# Patient Record
Sex: MALE | Race: WHITE | NOT HISPANIC OR LATINO | Employment: FULL TIME | URBAN - METROPOLITAN AREA
[De-identification: names, ages, dates, MRNs, and addresses within clinical notes are randomized per-mention and may not be internally consistent; named-entity substitution may affect disease eponyms.]

---

## 2021-08-04 ENCOUNTER — OFFICE VISIT (OUTPATIENT)
Dept: FAMILY MEDICINE CLINIC | Facility: CLINIC | Age: 26
End: 2021-08-04

## 2021-08-04 VITALS
TEMPERATURE: 97.1 F | OXYGEN SATURATION: 97 % | RESPIRATION RATE: 16 BRPM | DIASTOLIC BLOOD PRESSURE: 84 MMHG | HEIGHT: 72 IN | WEIGHT: 291.3 LBS | BODY MASS INDEX: 39.45 KG/M2 | HEART RATE: 100 BPM | SYSTOLIC BLOOD PRESSURE: 120 MMHG

## 2021-08-04 DIAGNOSIS — R19.7 DIARRHEA, UNSPECIFIED TYPE: ICD-10-CM

## 2021-08-04 DIAGNOSIS — Z00.01 ENCOUNTER FOR HEALTH MAINTENANCE EXAMINATION WITH ABNORMAL FINDINGS: Primary | ICD-10-CM

## 2021-08-04 DIAGNOSIS — Z87.09 HISTORY OF ASTHMA: ICD-10-CM

## 2021-08-04 DIAGNOSIS — Z13.1 SCREENING FOR DIABETES MELLITUS: ICD-10-CM

## 2021-08-04 DIAGNOSIS — S39.012D STRAIN OF LUMBAR REGION, SUBSEQUENT ENCOUNTER: ICD-10-CM

## 2021-08-04 DIAGNOSIS — F41.9 ANXIETY: ICD-10-CM

## 2021-08-04 DIAGNOSIS — Z13.0 SCREENING FOR DEFICIENCY ANEMIA: ICD-10-CM

## 2021-08-04 PROCEDURE — 99214 OFFICE O/P EST MOD 30 MIN: CPT | Performed by: NURSE PRACTITIONER

## 2021-08-04 PROCEDURE — 99385 PREV VISIT NEW AGE 18-39: CPT | Performed by: NURSE PRACTITIONER

## 2021-08-04 RX ORDER — ALBUTEROL SULFATE 90 UG/1
2 AEROSOL, METERED RESPIRATORY (INHALATION) EVERY 4 HOURS PRN
Qty: 6.7 G | Refills: 0 | Status: SHIPPED | OUTPATIENT
Start: 2021-08-04

## 2021-08-04 RX ORDER — HYDROXYZINE HYDROCHLORIDE 25 MG/1
25 TABLET, FILM COATED ORAL 3 TIMES DAILY PRN
Qty: 30 TABLET | Refills: 0 | Status: SHIPPED | OUTPATIENT
Start: 2021-08-04 | End: 2023-04-06
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
## 2021-08-05 LAB
ALBUMIN SERPL-MCNC: 4.6 G/DL (ref 3.5–5.2)
ALBUMIN/GLOB SERPL: 1.8 G/DL
ALP SERPL-CCNC: 81 U/L (ref 39–117)
ALT SERPL-CCNC: 25 U/L (ref 1–41)
AST SERPL-CCNC: 22 U/L (ref 1–40)
BASOPHILS # BLD AUTO: 0.06 10*3/MM3 (ref 0–0.2)
BASOPHILS NFR BLD AUTO: 0.7 % (ref 0–1.5)
BILIRUB SERPL-MCNC: 0.4 MG/DL (ref 0–1.2)
BUN SERPL-MCNC: 11 MG/DL (ref 6–20)
BUN/CREAT SERPL: 14.7 (ref 7–25)
CALCIUM SERPL-MCNC: 9.5 MG/DL (ref 8.6–10.5)
CHLORIDE SERPL-SCNC: 105 MMOL/L (ref 98–107)
CO2 SERPL-SCNC: 23.9 MMOL/L (ref 22–29)
CREAT SERPL-MCNC: 0.75 MG/DL (ref 0.76–1.27)
EOSINOPHIL # BLD AUTO: 0.17 10*3/MM3 (ref 0–0.4)
EOSINOPHIL NFR BLD AUTO: 1.9 % (ref 0.3–6.2)
ERYTHROCYTE [DISTWIDTH] IN BLOOD BY AUTOMATED COUNT: 12.6 % (ref 12.3–15.4)
GLOBULIN SER CALC-MCNC: 2.6 GM/DL
GLUCOSE SERPL-MCNC: 121 MG/DL (ref 65–99)
HBA1C MFR BLD: 5.3 % (ref 4.8–5.6)
HCT VFR BLD AUTO: 45.7 % (ref 37.5–51)
HGB BLD-MCNC: 15.7 G/DL (ref 13–17.7)
IMM GRANULOCYTES # BLD AUTO: 0.04 10*3/MM3 (ref 0–0.05)
IMM GRANULOCYTES NFR BLD AUTO: 0.4 % (ref 0–0.5)
LYMPHOCYTES # BLD AUTO: 1.86 10*3/MM3 (ref 0.7–3.1)
LYMPHOCYTES NFR BLD AUTO: 20.3 % (ref 19.6–45.3)
MCH RBC QN AUTO: 30.7 PG (ref 26.6–33)
MCHC RBC AUTO-ENTMCNC: 34.4 G/DL (ref 31.5–35.7)
MCV RBC AUTO: 89.3 FL (ref 79–97)
MONOCYTES # BLD AUTO: 0.75 10*3/MM3 (ref 0.1–0.9)
MONOCYTES NFR BLD AUTO: 8.2 % (ref 5–12)
NEUTROPHILS # BLD AUTO: 6.29 10*3/MM3 (ref 1.7–7)
NEUTROPHILS NFR BLD AUTO: 68.5 % (ref 42.7–76)
NRBC BLD AUTO-RTO: 0 /100 WBC (ref 0–0.2)
PLATELET # BLD AUTO: 331 10*3/MM3 (ref 140–450)
POTASSIUM SERPL-SCNC: 4 MMOL/L (ref 3.5–5.2)
PROT SERPL-MCNC: 7.2 G/DL (ref 6–8.5)
RBC # BLD AUTO: 5.12 10*6/MM3 (ref 4.14–5.8)
SODIUM SERPL-SCNC: 142 MMOL/L (ref 136–145)
TSH SERPL DL<=0.005 MIU/L-ACNC: 1.1 UIU/ML (ref 0.27–4.2)
WBC # BLD AUTO: 9.17 10*3/MM3 (ref 3.4–10.8)

## 2023-04-05 NOTE — PROGRESS NOTES
Chief Complaint  Annual Exam    Subjective        Brennan Sanchez presents to [facility]  History of Present Illness  Brennan Sanchez is a 27 y.o. male who presents to the clinic today for an annual physical exam.  Patient has a history of anxiety and asthma.  He denies recent asthma flareups or nighttime coughing.  He did have some symptoms in the winter with the weather change, but is doing well today.    Annual Exam:  · Diet: He stopped eating fast food and is eating a more gluten free diet. He is watching portion sizes and eating less.   · Exercise: He is exercising 4 times a week doing [employer] with strength and HIIT workouts. He also likes to walk after dinner.    · Immunizations: He has had two COVID-19 vaccines and one booster. He is due for second COVID-19 booster vaccine.  He is due for Tdap.    · Sleep/mental health: He denies issues with sleep.  He does have anxiety every now and then, but it is not debilitating.  He denies depressive symptoms. PHQ-2 Total Score: 0.   · Sexual health: Currently sexually active with 1 female partner.  Form of protection: Condom.   · Vision/dental: He denies problems with his vision.  He does not wear contacts or glasses.  He is not up-to-date with routine dental exams.  · Social history: Patient drinks alcohol socially.  He denies smoking or drug use.    · Family history: As listed below.     Health Maintenance   Topic Date Due   • TDAP/TD VACCINES (1 - Tdap) Never done   • HEPATITIS C SCREENING  Never done   • COVID-19 Vaccine (2 - Pfizer series) 04/12/2022   • INFLUENZA VACCINE  08/01/2023   • ANNUAL PHYSICAL  04/06/2024   • Pneumococcal Vaccine 0-64  Aged Out     Family History   Problem Relation Age of Onset   • No Known Problems Mother    • Hypertension Father    • Diabetes Father    • Heart disease Maternal Grandmother        Review of Systems   Constitutional: Negative.    HENT: Negative.    Eyes: Negative.    Respiratory: Negative.   "  Cardiovascular: Negative.    Gastrointestinal: Negative.    Endocrine: Negative.    Genitourinary: Negative.    Skin: Negative.    Neurological: Negative.    Psychiatric/Behavioral: Negative.       Objective   Vital Signs:  /78 (BP Location: Right arm, Patient Position: Sitting, Cuff Size: Large Adult)   Pulse 92   Temp 98.4 °F (36.9 °C) (Temporal)   Ht 182.9 cm (72\")   Wt 127 kg (280 lb 9.6 oz)   SpO2 98%   BMI 38.06 kg/m²   Estimated body mass index is 38.06 kg/m² as calculated from the following:    Height as of this encounter: 182.9 cm (72\").    Weight as of this encounter: 127 kg (280 lb 9.6 oz).             Physical Exam  Vitals reviewed.   Constitutional:       General: He is not in acute distress.     Appearance: Normal appearance. He is obese. He is not ill-appearing, toxic-appearing or diaphoretic.   HENT:      Head: Normocephalic and atraumatic.      Right Ear: Tympanic membrane and ear canal normal.      Left Ear: Tympanic membrane and ear canal normal.      Ears:      Comments: Mild erythema to bilateral ear canals  Eyes:      General: No scleral icterus.        Left eye: No discharge.      Extraocular Movements: Extraocular movements intact.   Neck:      Thyroid: No thyroid mass, thyromegaly or thyroid tenderness.   Cardiovascular:      Rate and Rhythm: Normal rate and regular rhythm.      Heart sounds: Normal heart sounds.   Pulmonary:      Effort: Pulmonary effort is normal. No respiratory distress.      Breath sounds: Normal breath sounds. No stridor. No wheezing.   Abdominal:      General: Bowel sounds are normal. There is no distension.      Palpations: Abdomen is soft.      Tenderness: There is no abdominal tenderness.   Neurological:      General: No focal deficit present.      Mental Status: He is alert and oriented to person, place, and time.      Motor: No weakness.      Gait: Gait normal.   Psychiatric:         Mood and Affect: Mood normal.         Behavior: Behavior normal. "        Result Review :                   Assessment and Plan   Diagnoses and all orders for this visit:    1. Encounter for health maintenance examination in adult (Primary)    2. Screening for lipid disorders  -     Lipid Panel    3. Screening for deficiency anemia  -     CBC w AUTO Differential    4. Need for hepatitis C screening test  -     Hepatitis C antibody    5. Screening for diabetes mellitus  -     Comprehensive metabolic panel    6. Elevated glucose  -     Hemoglobin A1c      Preventative counseling: Patient is a pleasant 27-year-old male here today for his annual physical.  Patient's vital signs are within normal limits. Body mass index is 38.06 kg/m².  Patient has made diet and exercise modifications.  We discussed limiting saturated fats and at least 150 minutes of moderate intensity physical activity per week.  Patient is due for Tdap and is agreeable to this today.  He can receive COVID-19 booster at his local pharmacy.  Patient reports improvement of bowel habits with gluten-free diet.  He is fasting, so routine blood work will be performed today. Patient declines STI screening.         Follow Up   Return in about 1 year (around 4/6/2024), or if symptoms worsen or fail to improve, for Annual physical.  Patient was given instructions and counseling regarding his condition or for health maintenance advice. Please see specific information pulled into the AVS if appropriate.     Electronically signed by MONA Hernandez, 04/06/23, 3:38 PM EDT.

## 2023-04-06 ENCOUNTER — OFFICE VISIT (OUTPATIENT)
Dept: FAMILY MEDICINE CLINIC | Facility: CLINIC | Age: 28
End: 2023-04-06
Payer: COMMERCIAL

## 2023-04-06 VITALS
HEART RATE: 92 BPM | OXYGEN SATURATION: 98 % | TEMPERATURE: 98.4 F | DIASTOLIC BLOOD PRESSURE: 78 MMHG | WEIGHT: 280.6 LBS | BODY MASS INDEX: 38.01 KG/M2 | SYSTOLIC BLOOD PRESSURE: 124 MMHG | HEIGHT: 72 IN

## 2023-04-06 DIAGNOSIS — Z13.220 SCREENING FOR LIPID DISORDERS: ICD-10-CM

## 2023-04-06 DIAGNOSIS — Z00.00 ENCOUNTER FOR HEALTH MAINTENANCE EXAMINATION IN ADULT: Primary | ICD-10-CM

## 2023-04-06 DIAGNOSIS — Z13.0 SCREENING FOR DEFICIENCY ANEMIA: ICD-10-CM

## 2023-04-06 DIAGNOSIS — R73.09 ELEVATED GLUCOSE: ICD-10-CM

## 2023-04-06 DIAGNOSIS — Z23 NEED FOR TDAP VACCINATION: ICD-10-CM

## 2023-04-06 DIAGNOSIS — Z13.1 SCREENING FOR DIABETES MELLITUS: ICD-10-CM

## 2023-04-06 DIAGNOSIS — Z11.59 NEED FOR HEPATITIS C SCREENING TEST: ICD-10-CM

## 2023-04-07 LAB
ALBUMIN SERPL-MCNC: 4.8 G/DL (ref 3.5–5.2)
ALBUMIN/GLOB SERPL: 1.9 G/DL
ALP SERPL-CCNC: 71 U/L (ref 39–117)
ALT SERPL-CCNC: 27 U/L (ref 1–41)
AST SERPL-CCNC: 27 U/L (ref 1–40)
BASOPHILS # BLD AUTO: 0.05 10*3/MM3 (ref 0–0.2)
BASOPHILS NFR BLD AUTO: 0.5 % (ref 0–1.5)
BILIRUB SERPL-MCNC: 0.6 MG/DL (ref 0–1.2)
BUN SERPL-MCNC: 12 MG/DL (ref 6–20)
BUN/CREAT SERPL: 15.4 (ref 7–25)
CALCIUM SERPL-MCNC: 10 MG/DL (ref 8.6–10.5)
CHLORIDE SERPL-SCNC: 106 MMOL/L (ref 98–107)
CHOLEST SERPL-MCNC: 188 MG/DL (ref 0–200)
CO2 SERPL-SCNC: 23 MMOL/L (ref 22–29)
CREAT SERPL-MCNC: 0.78 MG/DL (ref 0.76–1.27)
EGFRCR SERPLBLD CKD-EPI 2021: 124.6 ML/MIN/1.73
EOSINOPHIL # BLD AUTO: 0.11 10*3/MM3 (ref 0–0.4)
EOSINOPHIL NFR BLD AUTO: 1.1 % (ref 0.3–6.2)
ERYTHROCYTE [DISTWIDTH] IN BLOOD BY AUTOMATED COUNT: 12.9 % (ref 12.3–15.4)
GLOBULIN SER CALC-MCNC: 2.5 GM/DL
GLUCOSE SERPL-MCNC: 88 MG/DL (ref 65–99)
HBA1C MFR BLD: 5.3 % (ref 4.8–5.6)
HCT VFR BLD AUTO: 45 % (ref 37.5–51)
HCV IGG SERPL QL IA: NON REACTIVE
HDLC SERPL-MCNC: 39 MG/DL (ref 40–60)
HGB BLD-MCNC: 15.4 G/DL (ref 13–17.7)
IMM GRANULOCYTES # BLD AUTO: 0.04 10*3/MM3 (ref 0–0.05)
IMM GRANULOCYTES NFR BLD AUTO: 0.4 % (ref 0–0.5)
LDLC SERPL CALC-MCNC: 117 MG/DL (ref 0–100)
LYMPHOCYTES # BLD AUTO: 2.44 10*3/MM3 (ref 0.7–3.1)
LYMPHOCYTES NFR BLD AUTO: 24.1 % (ref 19.6–45.3)
MCH RBC QN AUTO: 30.3 PG (ref 26.6–33)
MCHC RBC AUTO-ENTMCNC: 34.2 G/DL (ref 31.5–35.7)
MCV RBC AUTO: 88.6 FL (ref 79–97)
MONOCYTES # BLD AUTO: 0.71 10*3/MM3 (ref 0.1–0.9)
MONOCYTES NFR BLD AUTO: 7 % (ref 5–12)
NEUTROPHILS # BLD AUTO: 6.77 10*3/MM3 (ref 1.7–7)
NEUTROPHILS NFR BLD AUTO: 66.9 % (ref 42.7–76)
NRBC BLD AUTO-RTO: 0 /100 WBC (ref 0–0.2)
PLATELET # BLD AUTO: 322 10*3/MM3 (ref 140–450)
POTASSIUM SERPL-SCNC: 3.9 MMOL/L (ref 3.5–5.2)
PROT SERPL-MCNC: 7.3 G/DL (ref 6–8.5)
RBC # BLD AUTO: 5.08 10*6/MM3 (ref 4.14–5.8)
SODIUM SERPL-SCNC: 140 MMOL/L (ref 136–145)
TRIGL SERPL-MCNC: 183 MG/DL (ref 0–150)
VLDLC SERPL CALC-MCNC: 32 MG/DL (ref 5–40)
WBC # BLD AUTO: 10.12 10*3/MM3 (ref 3.4–10.8)

## 2023-05-23 NOTE — PROGRESS NOTES
"Chief Complaint  Foot Pain (Pt reports extreme pain on inside of R heel x2m. NKI. Pt reports pain averages 6-7 but can jump to 9/10. No tenderness, but painful with pressure)    Subjective        Brennan Sanchez presents to [facility]  History of Present Illness  Brennan Sanchez is a 28 y.o. male who presents to clinic today with complaints of right foot pain.  Patient symptoms started about 2 months ago.  Patient denies any known trauma.  He has not started working out often at that point.  Patient's pain is worsened with standing.  Nothing helps the pain.  He has tried massaging and has also tried using a rollerball to his foot.  He also states the pain seems to be worse when waking up in the morning.  He denies swelling or bruising to his foot.      Review of Systems   Musculoskeletal: Positive for arthralgias.     Objective   Vital Signs:  /78   Pulse 78   Temp 97.7 °F (36.5 °C)   Ht 182.9 cm (72\")   Wt 133 kg (293 lb)   SpO2 99%   BMI 39.74 kg/m²   Estimated body mass index is 39.74 kg/m² as calculated from the following:    Height as of this encounter: 182.9 cm (72\").    Weight as of this encounter: 133 kg (293 lb).               Physical Exam  Vitals reviewed.   Constitutional:       General: He is not in acute distress.     Appearance: Normal appearance. He is obese. He is not ill-appearing, toxic-appearing or diaphoretic.   HENT:      Head: Normocephalic and atraumatic.   Eyes:      General: No scleral icterus.        Right eye: No discharge.         Left eye: No discharge.      Extraocular Movements: Extraocular movements intact.   Pulmonary:      Effort: Pulmonary effort is normal. No respiratory distress.   Musculoskeletal:      Right foot: Tenderness and bony tenderness (Heel) present. No swelling, deformity or laceration.   Feet:      Right foot:      Skin integrity: Skin integrity normal.   Neurological:      General: No focal deficit present.      Mental Status: " He is alert and oriented to person, place, and time.      Motor: No weakness.      Gait: Gait normal.   Psychiatric:         Mood and Affect: Mood normal.         Behavior: Behavior normal.        Result Review :                   Assessment and Plan   Diagnoses and all orders for this visit:    1. Pain of right heel (Primary)  -     XR Foot 3+ View Right (In Office)  -     meloxicam (MOBIC) 15 MG tablet; Take 1 tablet by mouth Daily As Needed for Mild Pain or Moderate Pain.  Dispense: 30 tablet; Refill: 0  -     Ambulatory Referral to Physical Therapy Evaluate and treat      Per xray, patient does have calcaneal heel spur, which could be contributing to his symptoms.  Recommended anti-inflammatory medications to be taken with food, call for any adverse side effects. He was referred to physical therapy.  Patient will call if symptoms do not improve.         Follow Up   Return if symptoms worsen or fail to improve.  Patient was given instructions and counseling regarding his condition or for health maintenance advice. Please see specific information pulled into the AVS if appropriate.       Answers for HPI/ROS submitted by the patient on 5/24/2023  What is the primary reason for your visit?: Lower Extremity Injury  Incident occurred: more than 1 week ago  Incident location: other  Injury mechanism: unknown  Pain location: right heel  Pain quality: shooting  Pain - numeric: 7/10  Pain course: fluctuating  tingling: No  inability to bear weight: No  loss of motion: Yes  loss of sensation: No  muscle weakness: No  Foreign body present: no foreign bodies    Electronically signed by MONA Hernandez, 05/26/23, 10:15 AM EDT.

## 2023-05-24 ENCOUNTER — OFFICE VISIT (OUTPATIENT)
Dept: FAMILY MEDICINE CLINIC | Facility: CLINIC | Age: 28
End: 2023-05-24
Payer: COMMERCIAL

## 2023-05-24 VITALS
WEIGHT: 293 LBS | OXYGEN SATURATION: 99 % | HEART RATE: 78 BPM | TEMPERATURE: 97.7 F | BODY MASS INDEX: 39.68 KG/M2 | DIASTOLIC BLOOD PRESSURE: 78 MMHG | SYSTOLIC BLOOD PRESSURE: 114 MMHG | HEIGHT: 72 IN

## 2023-05-24 DIAGNOSIS — M79.671 PAIN OF RIGHT HEEL: Primary | ICD-10-CM

## 2023-05-24 RX ORDER — MELOXICAM 15 MG/1
15 TABLET ORAL DAILY PRN
Qty: 30 TABLET | Refills: 0 | Status: SHIPPED | OUTPATIENT
Start: 2023-05-24

## 2023-07-26 ENCOUNTER — TREATMENT (OUTPATIENT)
Dept: PHYSICAL THERAPY | Facility: CLINIC | Age: 28
End: 2023-07-26
Payer: COMMERCIAL

## 2023-07-26 DIAGNOSIS — M79.671 PAIN OF RIGHT HEEL: Primary | ICD-10-CM

## 2023-07-26 NOTE — PROGRESS NOTES
Physical Therapy Daily Note    Patient Name: Brennan Sanchez         :  1995  Referring Physician: Tiana Abdul APRN  Treatment Date: 2023  Date of Initial Visit: No linked episodes    Visit Diagnoses:    ICD-10-CM ICD-9-CM   1. Pain of right heel  M79.671 729.5       _____________________________________________________    Subjective   Brennan Sanchez reports: minimal pain over weekend at [employer] despite being on feet all weekend - tape very helpful - Lasted until late Saturday -  no tape, but did well - no pain and minimal / no pain this week, but not working so not on his feet a lot -     Objective   Pt ambulating w/ normal gait   Minimal tenderness -     US: 1.0 MHz; 2.0 w/cm2 x 6 min to volar and medial calcaneus (R)      MANUAL THERAPY:   DTM to PF, Volar, Volar Med calcaneus (R)  Jt mobs to address Flexed 1st ray and Forefoot varus ; Gd lll w/ end-range oscillations -   Manual Stretching Gastrocs / Soleus / Plantar Facia  Forefoot mobs to address forefoot varus and flexed first ray - Grade lll     EXERCISE:   STANDING GASTROC STRETCHING 2x60 sec  STANDING SOLEUS STRETCHING 2x60 sec  STANDING STEP STRETCH 2x60 sec  SLS (R) LE x 2 min  WOBBLE BOARD - Front / Back; Side/Side; Angled x 2 2 min ea  HEEL RAISES off a STEP x 15       Functional / Therapeutic Activities:    TAPING / BRACING: Extensive Taping including 1in athletic tape x 4 strips from 1st MPJ around heel to 5, 4, 3, 2 MPJ;  2in athletic tape Lat foot up medial arch to unload w/ Leuko tape over x 2 layers:  To unload Plantar fascia and support medial arch (R)    Lap on track to assess pain / gait pattern w/ tape and after exercise     Assessment/Plan  27 yo male: (R) heel pain / Plantar fasciits -   Newer taping technique very helpful - pain significantly reduced even w/o tape and non-existent w/ tape in place. Improved functional ability -     Progress strengthening /stabilization /functional activity        _________________________________________________    Manual Therapy:            08     mins  84302;  Therapeutic Exercise:    08    mins  35253;     Neuromuscular Alondra:   08     mins  95531;    Therapeutic Activity:     20      mins  51341;     Ultrasound:                    06      mins  55076;  Iontophoresis:                     mins  93896;    Electrical Stimulation:         mins  72851 ( );  Mechanical Traction:          mins  53841  Dry Needling                       mins self-pay     Timed Treatment:   50    mins                  Total Treatment:     50    mins        Cristian Lange PT  Physical Therapist  Lic # [id number]

## 2023-07-31 ENCOUNTER — TREATMENT (OUTPATIENT)
Dept: PHYSICAL THERAPY | Facility: CLINIC | Age: 28
End: 2023-07-31
Payer: COMMERCIAL

## 2023-07-31 DIAGNOSIS — M79.671 PAIN OF RIGHT HEEL: Primary | ICD-10-CM

## 2023-07-31 PROCEDURE — 97035 APP MDLTY 1+ULTRASOUND EA 15: CPT | Performed by: PHYSICAL THERAPIST

## 2023-07-31 PROCEDURE — 97530 THERAPEUTIC ACTIVITIES: CPT | Performed by: PHYSICAL THERAPIST

## 2023-07-31 PROCEDURE — 97140 MANUAL THERAPY 1/> REGIONS: CPT | Performed by: PHYSICAL THERAPIST

## 2023-08-03 ENCOUNTER — TREATMENT (OUTPATIENT)
Dept: PHYSICAL THERAPY | Facility: CLINIC | Age: 28
End: 2023-08-03
Payer: COMMERCIAL

## 2023-08-03 DIAGNOSIS — M79.671 PAIN OF RIGHT HEEL: Primary | ICD-10-CM

## 2023-08-07 ENCOUNTER — TREATMENT (OUTPATIENT)
Dept: PHYSICAL THERAPY | Facility: CLINIC | Age: 28
End: 2023-08-07
Payer: COMMERCIAL

## 2023-08-07 DIAGNOSIS — M79.671 PAIN OF RIGHT HEEL: Primary | ICD-10-CM

## 2023-08-07 PROCEDURE — 97035 APP MDLTY 1+ULTRASOUND EA 15: CPT | Performed by: PHYSICAL THERAPIST

## 2023-08-07 PROCEDURE — 97530 THERAPEUTIC ACTIVITIES: CPT | Performed by: PHYSICAL THERAPIST

## 2023-08-07 PROCEDURE — 97140 MANUAL THERAPY 1/> REGIONS: CPT | Performed by: PHYSICAL THERAPIST

## 2023-08-07 NOTE — PROGRESS NOTES
Physical Therapy Daily Note    Patient Name: Brennan Sanchez         :  1995  Referring Physician: Tiana Abdul APRN  Treatment Date: 2023  Date of Initial Visit: Type: THERAPY  Noted: 2023    Visit Diagnoses:    ICD-10-CM ICD-9-CM   1. Pain of right heel  M79.671 729.5       _____________________________________________________    Subjective   Brennan Sanchez reports: tape very helpful - w/o tape - notes some soreness medial / volar heel -     Objective   Tender volar/medial calcaneus, but more localized and less severe -     US: 100%, 1.0 MHz, 2.0 W/CM2 x 8 min medial/volar calcaneus (R)    MANUAL THERAPY:   DTM to volar, Medial/Volar calcaneus and plantar fascia  Manual stretching gastroc / PF (R)   REARFOOT MOBS to Facilitate Calc Varus      Functional / Therapeutic Activities:    TAPING / BRACING: Extensive Taping including 1in athletic tape x 4 strips from 1st MPJ around heel to 5, 4, 3, 2 MPJ;  2in athletic tape Lat foot up medial arch to unload w/ Leuko tape over x 2 layers:  To unload Plantar fascia and support medial arch (R)    Leukotape to facilitate calcaneal varus (R)  Jt protection, ADL modification; Posture and       Assessment/Plan  27 yo male: (R) heel pain / Plantar fasciits -   Newer taping technique very helpful - pain significantly reduced even w/o tape and non-existent w/ tape in place. Improved functional ability -    Increased pain w/ work w/o tape, but alleviated w/ taping especially w/ taping to facilitate calcaneal varus -   Would benefit from more appropriate shoes and custom orthotics -      Progress strengthening /stabilization /functional activity     _________________________________________________     Manual Therapy:            15     mins  19975;  Therapeutic Exercise:        mins  71522;     Neuromuscular Alondra:        mins  63821;    Therapeutic Activity:     23      mins  78496;     Ultrasound:                    08      mins  17264;  Iontophoresis:                      mins  44372;    Electrical Stimulation:         mins  21468 ( );  Mechanical Traction:          mins  56901  Dry Needling                       mins self-pay     Timed Treatment:   46    mins                  Total Treatment:     46    mins      Cristian Lange PT  Physical Therapist  Lic # [id number]

## 2023-08-10 ENCOUNTER — TREATMENT (OUTPATIENT)
Dept: PHYSICAL THERAPY | Facility: CLINIC | Age: 28
End: 2023-08-10
Payer: COMMERCIAL

## 2023-08-10 DIAGNOSIS — M79.671 PAIN OF RIGHT HEEL: Primary | ICD-10-CM

## 2023-08-10 NOTE — PROGRESS NOTES
Physical Therapy Daily Note    Patient Name: Brennan Sanchez         :  1995  Referring Physician: Tiana Abdul APRN  Treatment Date: 8/10/2023  Date of Initial Visit: No linked episodes    Visit Diagnoses:    ICD-10-CM ICD-9-CM   1. Pain of right heel  M79.671 729.5       _____________________________________________________    Subjective   Brennan Sanchez reports: no pain w/ tape in place - tape came off yesterday and he did well w/o it at work today - Bought some OTC inserts     Objective   Much less tender volar/medial calcaneus (R) - much more localized and less severe -   Assessed inserts - added medial post to calcaneus (R) insert to prevent excessive valgus compensation        MANUAL THERAPY:   DTM to volar, Medial/Volar calcaneus and plantar fascia  Manual stretching gastroc / PF (R)   REARFOOT MOBS to Facilitate Calc Varus      Functional / Therapeutic Activities:    TAPING / BRACING: K-Tape to unload PF, facilitate transverse arch with leukotape to unload Plantar fascia and support medial arch (R)    Leukotape to facilitate calcaneal varus (R)  Modifed orthotic w/ medial rearfoot posting to prevent calcaneal valgus compensation     Assessment/Plan  29 yo male: (R) heel pain / Plantar fasciits -   Taping technique very helpful - pain significantly reduced even w/o tape and non-existent w/ tape in place. Improved functional ability -    Increased pain w/ work w/o tape, but alleviated w/ taping especially w/ taping to facilitate calcaneal varus -   Would benefit from more appropriate shoes and custom orthotics -      Progress strengthening /stabilization /functional activity     _________________________________________________     Manual Therapy:            15     mins  62943;  Therapeutic Exercise:        mins  26538;     Neuromuscular Alondra:        mins  10461;    Therapeutic Activity:     25      mins  64308;     Ultrasound:                          mins  48272;  Iontophoresis:                      mins  67080;    Electrical Stimulation:         mins  94946 ( );  Mechanical Traction:          mins  13424  Dry Needling                       mins self-pay     Timed Treatment:   40  mins                  Total Treatment:     40    mins        Cristian Lange PT  Physical Therapist  Lic # [id number]

## 2023-08-14 ENCOUNTER — TREATMENT (OUTPATIENT)
Dept: PHYSICAL THERAPY | Facility: CLINIC | Age: 28
End: 2023-08-14
Payer: COMMERCIAL

## 2023-08-14 DIAGNOSIS — M79.671 PAIN OF RIGHT HEEL: Primary | ICD-10-CM

## 2023-08-14 NOTE — PROGRESS NOTES
Physical Therapy Daily Note    Patient Name: Brennan Sanchez         :  1995  Referring Physician: Tiana Abdul APRN  Treatment Date: 2023  Date of Initial Visit: Type: THERAPY  Noted: 2023    Visit Diagnoses:    ICD-10-CM ICD-9-CM   1. Pain of right heel  M79.671 729.5       _____________________________________________________    Subjective   Brennan Sanchez reports: continued improvement - Heel posting very helpful - worked today w/o tape and noted no heel pain -   Wants wife to learn how to tape for future use if needed -     Objective   Much less tender medial / volar calcaneus and much more isolated -       MANUAL THERAPY:   DTM to volar, Medial/Volar calcaneus and plantar fascia  Manual stretching gastroc / PF (R)   REARFOOT MOBS to Facilitate Calc Varus      Functional / Therapeutic Activities:    TAPING / BRACING: K-Tape to unload PF, facilitate transverse arch with leukotape to unload Plantar fascia and support medial arch (R)    Leukotape to facilitate calcaneal varus (R)  Instructed wife in use of tape and application as noted above     Assessment/Plan  27 yo male: (R) heel pain / Plantar fasciits -   Taping technique very helpful - pain significantly reduced even w/o tape and non-existent w/ tape in place. Improved functional ability -    May benefit from more appropriate shoes and custom orthotics -      Progress strengthening /stabilization /functional activity     _________________________________________________    Manual Therapy:            08     mins  84007;  Therapeutic Exercise:        mins  12385;     Neuromuscular Alondra:        mins  14028;    Therapeutic Activity:      23     mins  61728;     Ultrasound:                          mins  45932;  Iontophoresis:                     mins  19008;    Electrical Stimulation:         mins  38987 ( );  Mechanical Traction:          mins  78861  Dry Needling                       mins self-pay     Timed Treatment:   31    mins                   Total Treatment:     31    mins        Cristian Lange, PT  Physical Therapist  Lic # [id number]

## 2023-08-21 ENCOUNTER — TREATMENT (OUTPATIENT)
Dept: PHYSICAL THERAPY | Facility: CLINIC | Age: 28
End: 2023-08-21
Payer: COMMERCIAL

## 2023-08-21 DIAGNOSIS — M79.671 PAIN OF RIGHT HEEL: Primary | ICD-10-CM

## 2023-08-21 NOTE — PROGRESS NOTES
Physical Therapy Daily Note    Patient Name: Brennan Sanchez         :  1995  Referring Physician: Tiana Abdul APRN  Treatment Date: 2023  Date of Initial Visit: No linked episodes    Visit Diagnoses:    ICD-10-CM ICD-9-CM   1. Pain of right heel  M79.671 729.5       _____________________________________________________    Subjective   Brennan Sanchez reports: tape came off last Wednesday - Decreased volar/medial heel pain even w/o tape even working - Still notes some pain 3/10 at worst - best with taping -     Objective   Tenderness much more isolated volar/med calcaneus - (R)   AROM WNL   Improved flexibility grossly -     TREATMENT:   EXERCISES:   GASTROC and SOLEUS STRETCHING; 2x 60 sec ea (R)  STEP GASTROC / PF STRETCH 2x 60 sec (R)  HEEL RAISES OFF STEP x 20  SLS on /2 dome ball; x 2 min (R)  BALANCE BALL: Frnt/Bk; Side/Side; Angled x 2   x 2 min each  RUNNER STEP UP (hole 4 from bottom) x 20 R/L  LATERAL STEP UP w/ ABDuction (hole 4 from bottom) x 20 R/L  TREADMILL: Fwd; 2.5 mph  2 min 0-deg; 1 min 5-deg;  1 min 10-deg; 1 min 15 deg incline                        Retro;  1.0 mph;  2 min 0-deg; 1 min 5-deg;  1 min 10-deg; 1 min 15 deg incline    NMR:   Verbal and tactile cues to facilitate / inhibit firing of glute medius / LE musculature statically and dynamically.; Balance / Proprioception Activities - SEE EXERCISEs -     Functional / Therapeutic Activities:    TAPING / BRACING: K-Tape to unload PF, facilitate transverse arch with leukotape to unload Plantar fascia and support medial arch (R)  -HELD  Leukotape to facilitate calcaneal varus (R) --HELD  FUNCTIONAL ASSESSMENT -  SEE EXERCISE FLOW SHEET -        FAAM 71/84 = CI:  At least 1%, but < 20% impaired    Assessment/Plan  27 yo male: (R) heel pain / Plantar fasciits -   Taping technique very helpful - pain significantly reduced even w/o tape and non-existent w/ tape in place. Improved functional ability -    May benefit from more  appropriate shoes and custom orthotics -    GOAL STATUS:   SHORT TERM GOALS:    1) HEP Initiated; -MET  2) Pain decreased 50%: -MET  3) Improved Gait / functional ability with foot taping - MET    LONG TERM GOALS::   1) (I) HEP; - PROGRESSING TOWARDS  2) Strength to be able to perform all ADL's and job-related activities w/o pain including climbing in/out of truck, ramps w/ a load;   3) Pt able to ambulate functional distances for ADL, Job, Exercising w/o pain including first thing in AM and after sitting - w/o taping - PROGRESSING TOWARDS  4) FAAM Score increased > 80      Brennan would benefit from continued Physical Therapy to allow full and safe return to ADLs and his normal job duties.     Recommend continued Physical Therapy to allow a full and safe return to ADL's and normal job duties. 2x/wk x 4 weeks -   Progress strengthening /stabilization /functional activity       _________________________________________________    Manual Therapy:                 mins  02662;  Therapeutic Exercise:    10    mins  33127;     Neuromuscular Alondra:   10     mins  45641;    Therapeutic Activity:     25      mins  84654;     Ultrasound:                          mins  63909;  Iontophoresis:                     mins  69728;    Electrical Stimulation:         mins  81136 ( );  Mechanical Traction:          mins  79103  Dry Needling                       mins self-pay     Timed Treatment:   45    mins                  Total Treatment:     45    mins        Cristian Lange PT  Physical Therapist  Lic # [id number]

## 2023-08-24 ENCOUNTER — TREATMENT (OUTPATIENT)
Dept: PHYSICAL THERAPY | Facility: CLINIC | Age: 28
End: 2023-08-24
Payer: COMMERCIAL

## 2023-08-24 DIAGNOSIS — M79.671 PAIN OF RIGHT HEEL: Primary | ICD-10-CM

## 2023-08-28 NOTE — PROGRESS NOTES
Physical Therapy Daily Note    Patient Name: Brennan Sanchez         :  1995  Referring Physician: Tiana Abdul APRN  Treatment Date: 2023  Date of Initial Visit: Type: THERAPY  Noted: 2023    Visit Diagnoses:    ICD-10-CM ICD-9-CM   1. Pain of right heel  M79.671 729.5       _____________________________________________________    Subjective   Brennan Sanchez reports: purchasing new shoes and with his modified inserts, he has had no pain even with working multiple days - Notes no functional limitations - 100% improved -    Objective   Pt presents w/ HOKA shoes - Normal gait - Full ROM - Minimal / no tenderness medial/volar calcaneus (R) -     TREATMENT:     EXERCISES:  Reviewed HEP      Functional / Therapeutic Activities:    TAPING / BRACING: NA  Assessed shoes and appropriateness -   FUNATIONAL ASSESSMENT _  Reviewed Jt protection, importance to maintain stretching / HEP to prevent future issues -     Assessment/Plan  27 yo male: (R) heel pain / Plantar fasciits -   Taping techniques were very helpful in alleviating pain and allowing improved gait / function - Now even painfree w/o tape, but w/ customized orthotic and new shoes -    GOALS MET -   Brennan would benefit from continuing with his HEP and discontinuing formal Physical Therapy at this time -     DC PT to HEP  -        _________________________________________________    Manual Therapy:                 mins  22411;  Therapeutic Exercise:    08    mins  55305;     Neuromuscular Alondra:        mins  85957;    Therapeutic Activity:     23      mins  86741;     Ultrasound:                          mins  01507;  Iontophoresis:                     mins  18674;    Electrical Stimulation:         mins  53519 ( );  Mechanical Traction:          mins  53311  Dry Needling                       mins self-pay     Timed Treatment:   31    mins                  Total Treatment:     31    mins        Cristian Lange, PT  Physical Therapist  Lic #  [id number]    Access Code: [email or web address]  URL: [email or web address]  Date: 08/27/2023  Prepared by: Eleazar Lange    Exercises  - Towel Scrunches  - 2-3 x daily - 7 x weekly - 1 sets - 20 reps - 3s hold  - Gastroc Stretch on Wall  - 2-3 x daily - 7 x weekly - 1 sets - 2 reps - 60s hold  - Soleus Stretch on Wall  - 2-3 x daily - 7 x weekly - 1 sets - 2 reps - 60s hold  - Single Leg Stance  - 1-2 x daily - 7 x weekly - 1 sets - 1-2 min hold  - Standing Bilateral Heel Raise on Step  - 1 x daily - 7 x weekly - 2-3 sets - 10-15 reps - 3-5 hold  - Single Leg Stance on Foam Pad  - 1 x daily - 7 x weekly - 1 sets - 2-3 minutes hold

## 2023-11-29 DIAGNOSIS — Z87.09 HISTORY OF ASTHMA: ICD-10-CM

## 2023-11-29 RX ORDER — ALBUTEROL SULFATE 90 UG/1
2 AEROSOL, METERED RESPIRATORY (INHALATION) EVERY 4 HOURS PRN
Qty: 6.7 G | Refills: 0 | Status: SHIPPED | OUTPATIENT
Start: 2023-11-29

## 2024-05-08 ENCOUNTER — OFFICE VISIT (OUTPATIENT)
Dept: FAMILY MEDICINE CLINIC | Facility: CLINIC | Age: 29
End: 2024-05-08
Payer: COMMERCIAL

## 2024-05-08 VITALS
HEIGHT: 72 IN | DIASTOLIC BLOOD PRESSURE: 82 MMHG | WEIGHT: 296 LBS | SYSTOLIC BLOOD PRESSURE: 122 MMHG | HEART RATE: 77 BPM | OXYGEN SATURATION: 98 % | TEMPERATURE: 98 F | BODY MASS INDEX: 40.09 KG/M2

## 2024-05-08 DIAGNOSIS — Z13.1 SCREENING FOR DIABETES MELLITUS: ICD-10-CM

## 2024-05-08 DIAGNOSIS — E66.01 CLASS 3 SEVERE OBESITY WITHOUT SERIOUS COMORBIDITY WITH BODY MASS INDEX (BMI) OF 40.0 TO 44.9 IN ADULT, UNSPECIFIED OBESITY TYPE: ICD-10-CM

## 2024-05-08 DIAGNOSIS — Z13.220 SCREENING FOR LIPID DISORDERS: ICD-10-CM

## 2024-05-08 DIAGNOSIS — Z13.29 SCREENING FOR THYROID DISORDER: ICD-10-CM

## 2024-05-08 DIAGNOSIS — Z00.00 ENCOUNTER FOR HEALTH MAINTENANCE EXAMINATION IN ADULT: Primary | ICD-10-CM

## 2024-05-08 DIAGNOSIS — Z13.0 SCREENING FOR DEFICIENCY ANEMIA: ICD-10-CM

## 2024-05-08 PROCEDURE — 99395 PREV VISIT EST AGE 18-39: CPT | Performed by: NURSE PRACTITIONER

## 2024-05-14 LAB
ALBUMIN SERPL-MCNC: 4.9 G/DL (ref 3.5–5.2)
ALBUMIN/GLOB SERPL: 1.8 G/DL
ALP SERPL-CCNC: 71 U/L (ref 39–117)
ALT SERPL-CCNC: 28 U/L (ref 1–41)
AST SERPL-CCNC: 27 U/L (ref 1–40)
BASOPHILS # BLD AUTO: 0.05 10*3/MM3 (ref 0–0.2)
BASOPHILS NFR BLD AUTO: 0.5 % (ref 0–1.5)
BILIRUB SERPL-MCNC: 0.5 MG/DL (ref 0–1.2)
BUN SERPL-MCNC: 13 MG/DL (ref 6–20)
BUN/CREAT SERPL: 16.5 (ref 7–25)
CALCIUM SERPL-MCNC: 9.7 MG/DL (ref 8.6–10.5)
CHLORIDE SERPL-SCNC: 102 MMOL/L (ref 98–107)
CHOLEST SERPL-MCNC: 192 MG/DL (ref 0–200)
CO2 SERPL-SCNC: 23.4 MMOL/L (ref 22–29)
CREAT SERPL-MCNC: 0.79 MG/DL (ref 0.76–1.27)
EGFRCR SERPLBLD CKD-EPI 2021: 123.3 ML/MIN/1.73
EOSINOPHIL # BLD AUTO: 0.18 10*3/MM3 (ref 0–0.4)
EOSINOPHIL NFR BLD AUTO: 1.8 % (ref 0.3–6.2)
ERYTHROCYTE [DISTWIDTH] IN BLOOD BY AUTOMATED COUNT: 12.8 % (ref 12.3–15.4)
GLOBULIN SER CALC-MCNC: 2.7 GM/DL
GLUCOSE SERPL-MCNC: 80 MG/DL (ref 65–99)
HBA1C MFR BLD: 5.6 % (ref 4.8–5.6)
HCT VFR BLD AUTO: 46.3 % (ref 37.5–51)
HDLC SERPL-MCNC: 44 MG/DL (ref 40–60)
HGB BLD-MCNC: 15.8 G/DL (ref 13–17.7)
IMM GRANULOCYTES # BLD AUTO: 0.05 10*3/MM3 (ref 0–0.05)
IMM GRANULOCYTES NFR BLD AUTO: 0.5 % (ref 0–0.5)
LDLC SERPL CALC-MCNC: 119 MG/DL (ref 0–100)
LYMPHOCYTES # BLD AUTO: 2.61 10*3/MM3 (ref 0.7–3.1)
LYMPHOCYTES NFR BLD AUTO: 26.2 % (ref 19.6–45.3)
MCH RBC QN AUTO: 30.1 PG (ref 26.6–33)
MCHC RBC AUTO-ENTMCNC: 34.1 G/DL (ref 31.5–35.7)
MCV RBC AUTO: 88.2 FL (ref 79–97)
MONOCYTES # BLD AUTO: 0.89 10*3/MM3 (ref 0.1–0.9)
MONOCYTES NFR BLD AUTO: 8.9 % (ref 5–12)
NEUTROPHILS # BLD AUTO: 6.19 10*3/MM3 (ref 1.7–7)
NEUTROPHILS NFR BLD AUTO: 62.1 % (ref 42.7–76)
NRBC BLD AUTO-RTO: 0 /100 WBC (ref 0–0.2)
PLATELET # BLD AUTO: 339 10*3/MM3 (ref 140–450)
POTASSIUM SERPL-SCNC: 4.1 MMOL/L (ref 3.5–5.2)
PROT SERPL-MCNC: 7.6 G/DL (ref 6–8.5)
RBC # BLD AUTO: 5.25 10*6/MM3 (ref 4.14–5.8)
SODIUM SERPL-SCNC: 140 MMOL/L (ref 136–145)
TESTOST FREE SERPL-MCNC: 6.8 PG/ML (ref 9.3–26.5)
TESTOST SERPL-MCNC: 288 NG/DL (ref 264–916)
TRIGL SERPL-MCNC: 166 MG/DL (ref 0–150)
TSH SERPL DL<=0.005 MIU/L-ACNC: 1.26 UIU/ML (ref 0.27–4.2)
VLDLC SERPL CALC-MCNC: 29 MG/DL (ref 5–40)
WBC # BLD AUTO: 9.97 10*3/MM3 (ref 3.4–10.8)

## 2024-05-15 ENCOUNTER — TELEPHONE (OUTPATIENT)
Dept: FAMILY MEDICINE CLINIC | Facility: CLINIC | Age: 29
End: 2024-05-15
Payer: COMMERCIAL

## 2024-05-15 DIAGNOSIS — R79.89 LOW TESTOSTERONE: Primary | ICD-10-CM

## 2024-05-15 NOTE — TELEPHONE ENCOUNTER
Caller: Brennan Sanchez    Relationship: Self    Best call back number: [phone]    What orders are you requesting (i.e. lab or imaging): LABS    In what timeframe would the patient need to come in: WITHIN 3 MONTHS    Where will you receive your lab/imaging services: OFFICE    PLEASE CALL TO SCHEDULE

## 2024-07-18 DIAGNOSIS — R79.89 LOW TESTOSTERONE: ICD-10-CM

## 2024-08-18 LAB
TESTOST FREE SERPL-MCNC: 8.1 PG/ML (ref 8.7–25.1)
TESTOST SERPL-MCNC: 266 NG/DL (ref 264–916)

## 2024-08-21 ENCOUNTER — PATIENT MESSAGE (OUTPATIENT)
Dept: FAMILY MEDICINE CLINIC | Facility: CLINIC | Age: 29
End: 2024-08-21
Payer: COMMERCIAL

## 2024-08-21 DIAGNOSIS — R79.89 LOW TESTOSTERONE: Primary | ICD-10-CM

## 2024-10-02 ENCOUNTER — OFFICE VISIT (OUTPATIENT)
Dept: ENDOCRINOLOGY | Age: 29
End: 2024-10-02
Payer: COMMERCIAL

## 2024-10-02 VITALS
HEIGHT: 72 IN | DIASTOLIC BLOOD PRESSURE: 80 MMHG | HEART RATE: 81 BPM | WEIGHT: 278.6 LBS | BODY MASS INDEX: 37.73 KG/M2 | OXYGEN SATURATION: 97 % | SYSTOLIC BLOOD PRESSURE: 124 MMHG

## 2024-10-02 DIAGNOSIS — R79.89 LOW TESTOSTERONE IN MALE: Primary | ICD-10-CM

## 2024-10-02 NOTE — PROGRESS NOTES
Referring provider: MONA Eugene     Chief complaint/Reason for consult: low testosterone    HPI:   - 29 year old male here for low testosterone  - Complains of fatigue, low sex drive  - No testicular surgeries/injuries  - No head injuries  - Has never been on testosterone before    The following portions of the patient's history were reviewed and updated as appropriate: allergies, current medications, past family history, past medical history, past social history, past surgical history, and problem list.      Objective     Vitals:    10/02/24 1410   BP: 124/80   Pulse: 81   SpO2: 97%        Physical Exam  Vitals reviewed.   Constitutional:       Appearance: Normal appearance.   HENT:      Head: Normocephalic and atraumatic.   Eyes:      General: No scleral icterus.  Neurological:      Mental Status: He is alert.      Gait: Gait normal.   Psychiatric:         Mood and Affect: Mood normal.         Behavior: Behavior normal.         Thought Content: Thought content normal.         Judgment: Judgment normal.         Assessment & Plan   Low testosterone  - Will do labs for etiology  - Follow-up will depend on lab results

## 2024-10-03 DIAGNOSIS — E23.0 HYPOGONADOTROPIC HYPOGONADISM: Primary | ICD-10-CM

## 2024-10-03 LAB
FERRITIN SERPL-MCNC: 210 NG/ML (ref 30–400)
FSH SERPL-ACNC: 0.8 MIU/ML (ref 1.5–12.4)
HCT VFR BLD AUTO: 45.3 % (ref 37.5–51)
LH SERPL-ACNC: 4.9 MIU/ML (ref 1.7–8.6)
PROLACTIN SERPL-MCNC: 9.7 NG/ML (ref 3.6–31.5)
SHBG SERPL-SCNC: 22.6 NMOL/L (ref 16.5–55.9)
TESTOST SERPL-MCNC: 223 NG/DL (ref 264–916)

## 2025-05-07 DIAGNOSIS — Z13.220 SCREENING FOR LIPID DISORDERS: ICD-10-CM

## 2025-05-07 DIAGNOSIS — Z13.0 SCREENING FOR DEFICIENCY ANEMIA: ICD-10-CM

## 2025-05-07 DIAGNOSIS — Z13.228 SCREENING FOR ENDOCRINE, METABOLIC AND IMMUNITY DISORDER: ICD-10-CM

## 2025-05-07 DIAGNOSIS — Z13.29 SCREENING FOR THYROID DISORDER: ICD-10-CM

## 2025-05-07 DIAGNOSIS — Z13.0 SCREENING FOR ENDOCRINE, METABOLIC AND IMMUNITY DISORDER: ICD-10-CM

## 2025-05-07 DIAGNOSIS — R79.89 LOW TESTOSTERONE: Primary | ICD-10-CM

## 2025-05-07 DIAGNOSIS — Z13.29 SCREENING FOR ENDOCRINE, METABOLIC AND IMMUNITY DISORDER: ICD-10-CM

## 2025-05-07 DIAGNOSIS — Z13.1 SCREENING FOR DIABETES MELLITUS: ICD-10-CM

## 2025-05-08 DIAGNOSIS — R79.89 LOW TESTOSTERONE: ICD-10-CM

## 2025-05-08 DIAGNOSIS — Z13.29 SCREENING FOR THYROID DISORDER: ICD-10-CM

## 2025-05-08 DIAGNOSIS — Z13.0 SCREENING FOR DEFICIENCY ANEMIA: ICD-10-CM

## 2025-05-08 DIAGNOSIS — Z13.1 SCREENING FOR DIABETES MELLITUS: ICD-10-CM

## 2025-05-08 DIAGNOSIS — Z13.228 SCREENING FOR ENDOCRINE, METABOLIC AND IMMUNITY DISORDER: ICD-10-CM

## 2025-05-08 DIAGNOSIS — Z13.0 SCREENING FOR ENDOCRINE, METABOLIC AND IMMUNITY DISORDER: ICD-10-CM

## 2025-05-08 DIAGNOSIS — Z13.220 SCREENING FOR LIPID DISORDERS: ICD-10-CM

## 2025-05-08 DIAGNOSIS — Z13.29 SCREENING FOR ENDOCRINE, METABOLIC AND IMMUNITY DISORDER: ICD-10-CM

## 2025-05-12 LAB
ALBUMIN SERPL-MCNC: 4.6 G/DL (ref 3.5–5.2)
ALBUMIN/GLOB SERPL: 1.9 G/DL
ALP SERPL-CCNC: 75 U/L (ref 39–117)
ALT SERPL-CCNC: 26 U/L (ref 1–41)
AST SERPL-CCNC: 27 U/L (ref 1–40)
BASOPHILS # BLD AUTO: 0.05 10*3/MM3 (ref 0–0.2)
BASOPHILS NFR BLD AUTO: 0.6 % (ref 0–1.5)
BILIRUB SERPL-MCNC: 0.5 MG/DL (ref 0–1.2)
BUN SERPL-MCNC: 13 MG/DL (ref 6–20)
BUN/CREAT SERPL: 16.7 (ref 7–25)
CALCIUM SERPL-MCNC: 9.6 MG/DL (ref 8.6–10.5)
CHLORIDE SERPL-SCNC: 103 MMOL/L (ref 98–107)
CHOLEST SERPL-MCNC: 184 MG/DL (ref 0–200)
CO2 SERPL-SCNC: 26.7 MMOL/L (ref 22–29)
CREAT SERPL-MCNC: 0.78 MG/DL (ref 0.76–1.27)
EGFRCR SERPLBLD CKD-EPI 2021: 123 ML/MIN/1.73
EOSINOPHIL # BLD AUTO: 0.13 10*3/MM3 (ref 0–0.4)
EOSINOPHIL NFR BLD AUTO: 1.6 % (ref 0.3–6.2)
ERYTHROCYTE [DISTWIDTH] IN BLOOD BY AUTOMATED COUNT: 12.7 % (ref 12.3–15.4)
GLOBULIN SER CALC-MCNC: 2.4 GM/DL
GLUCOSE SERPL-MCNC: 83 MG/DL (ref 65–99)
HBA1C MFR BLD: 5.4 % (ref 4.8–5.6)
HCT VFR BLD AUTO: 44.7 % (ref 37.5–51)
HDLC SERPL-MCNC: 43 MG/DL (ref 40–60)
HGB BLD-MCNC: 15.1 G/DL (ref 13–17.7)
IMM GRANULOCYTES # BLD AUTO: 0.03 10*3/MM3 (ref 0–0.05)
IMM GRANULOCYTES NFR BLD AUTO: 0.4 % (ref 0–0.5)
LDLC SERPL CALC-MCNC: 121 MG/DL (ref 0–100)
LYMPHOCYTES # BLD AUTO: 2.18 10*3/MM3 (ref 0.7–3.1)
LYMPHOCYTES NFR BLD AUTO: 26 % (ref 19.6–45.3)
MCH RBC QN AUTO: 29.9 PG (ref 26.6–33)
MCHC RBC AUTO-ENTMCNC: 33.8 G/DL (ref 31.5–35.7)
MCV RBC AUTO: 88.5 FL (ref 79–97)
MONOCYTES # BLD AUTO: 0.72 10*3/MM3 (ref 0.1–0.9)
MONOCYTES NFR BLD AUTO: 8.6 % (ref 5–12)
NEUTROPHILS # BLD AUTO: 5.26 10*3/MM3 (ref 1.7–7)
NEUTROPHILS NFR BLD AUTO: 62.8 % (ref 42.7–76)
NRBC BLD AUTO-RTO: 0 /100 WBC (ref 0–0.2)
PLATELET # BLD AUTO: 294 10*3/MM3 (ref 140–450)
POTASSIUM SERPL-SCNC: 4.4 MMOL/L (ref 3.5–5.2)
PROT SERPL-MCNC: 7 G/DL (ref 6–8.5)
RBC # BLD AUTO: 5.05 10*6/MM3 (ref 4.14–5.8)
SODIUM SERPL-SCNC: 140 MMOL/L (ref 136–145)
TESTOST FREE SERPL-MCNC: 5.6 PG/ML (ref 8.7–25.1)
TESTOST SERPL-MCNC: 263.5 NG/DL (ref 264–916)
TRIGL SERPL-MCNC: 113 MG/DL (ref 0–150)
TSH SERPL DL<=0.005 MIU/L-ACNC: 1.27 UIU/ML (ref 0.27–4.2)
VLDLC SERPL CALC-MCNC: 20 MG/DL (ref 5–40)
WBC # BLD AUTO: 8.37 10*3/MM3 (ref 3.4–10.8)

## 2025-05-16 ENCOUNTER — OFFICE VISIT (OUTPATIENT)
Dept: FAMILY MEDICINE CLINIC | Facility: CLINIC | Age: 30
End: 2025-05-16
Payer: COMMERCIAL

## 2025-05-16 VITALS
HEART RATE: 110 BPM | SYSTOLIC BLOOD PRESSURE: 132 MMHG | WEIGHT: 288 LBS | DIASTOLIC BLOOD PRESSURE: 86 MMHG | OXYGEN SATURATION: 98 % | BODY MASS INDEX: 39.01 KG/M2 | TEMPERATURE: 98.1 F | HEIGHT: 72 IN

## 2025-05-16 DIAGNOSIS — R06.83 SNORING: ICD-10-CM

## 2025-05-16 DIAGNOSIS — Z00.00 ENCOUNTER FOR HEALTH MAINTENANCE EXAMINATION IN ADULT: Primary | ICD-10-CM

## 2025-05-16 PROCEDURE — 99395 PREV VISIT EST AGE 18-39: CPT | Performed by: NURSE PRACTITIONER

## 2025-05-16 NOTE — PROGRESS NOTES
"Chief Complaint  Annual Exam (Physical - labs 5/8/25 - RIK 0 PHQ 0)    Subjective        Brennan Sanchez presents to [facility]  History of Present Illness  Brennan Sanchez is a 29 y.o. male who presents to the clinic today for an annual physical exam. He saw endocrinology for low testosterone.  He had pituitary MRI performed that was normal.    Annual Exam.  Diet: He is eating two meals a day. He has made diet modifications.   Exercise: He is exercising around 3-4 days a week. He is doing cardio and home strength routine.   : He does use the bathroom in the middle of the night on occasion.  Immunizations: Due for COVID-19 vaccine.   Colon cancer screening: No GI issues.   Sleep/mental health: He has issue staying asleep. He does snore. No concerns with anxiety or depression.   Sexual health:One female partner.   Social history: He has one alcoholic drink a month. No drug use or smoking  Vision/dental: No vision changes. Up to date with dentist.   Family history: As listed below.     Health Maintenance   Topic Date Due    COVID-19 Vaccine (2 - 2024-25 season) 05/30/2025 (Originally 9/1/2024)    INFLUENZA VACCINE  07/01/2025    ANNUAL PHYSICAL  05/16/2026    TDAP/TD VACCINES (3 - Td or Tdap) 04/07/2033    HEPATITIS C SCREENING  Completed    Pneumococcal Vaccine 0-49  Aged Out         Family History   Problem Relation Age of Onset    No Known Problems Mother     Other Father     Hypertension Father     Diabetes Father     Hypotension Father     Heart disease Maternal Grandmother          Objective   Vital Signs:  /86   Pulse 110   Temp 98.1 °F (36.7 °C)   Ht 182.9 cm (72.01\")   Wt 131 kg (288 lb)   SpO2 98%   BMI 39.05 kg/m²   Estimated body mass index is 39.05 kg/m² as calculated from the following:    Height as of this encounter: 182.9 cm (72.01\").    Weight as of this encounter: 131 kg (288 lb).          Physical Exam  Vitals reviewed.   Constitutional:       General: He is " not in acute distress.     Appearance: Normal appearance. He is well-developed. He is not ill-appearing, toxic-appearing or diaphoretic.   HENT:      Head: Normocephalic and atraumatic.   Eyes:      General: No scleral icterus.        Right eye: No discharge.         Left eye: No discharge.      Extraocular Movements: Extraocular movements intact.   Neck:      Thyroid: No thyroid mass, thyromegaly or thyroid tenderness.   Cardiovascular:      Rate and Rhythm: Normal rate and regular rhythm.      Heart sounds: Normal heart sounds.   Pulmonary:      Effort: Pulmonary effort is normal.      Breath sounds: Normal breath sounds.   Abdominal:      General: Bowel sounds are normal.      Palpations: Abdomen is soft.   Musculoskeletal:         General: Normal range of motion.      Cervical back: Normal range of motion.      Right lower leg: No edema.      Left lower leg: No edema.   Skin:     General: Skin is warm.   Neurological:      General: No focal deficit present.      Mental Status: He is alert and oriented to person, place, and time.   Psychiatric:         Behavior: Behavior normal.        Result Review :    Common labs          10/2/2024    14:31 5/8/2025    09:49   Common Labs   Glucose  83    BUN  13    Creatinine  0.78    Sodium  140    Potassium  4.4    Chloride  103    Calcium  9.6    Albumin  4.6    Total Bilirubin  0.5    Alkaline Phosphatase  75    AST (SGOT)  27    ALT (SGPT)  26    WBC  8.37    Hemoglobin  15.1    Hematocrit 45.3  44.7    Platelets  294    Total Cholesterol  184    Triglycerides  113    HDL Cholesterol  43    LDL Cholesterol   121    Hemoglobin A1C  5.40      Data reviewed : Consultant notes endocrinology            Assessment and Plan   Diagnoses and all orders for this visit:    1. Encounter for health maintenance examination in adult (Primary)    2. Snoring  -     Ambulatory Referral to Sleep Medicine      Preventative counseling. Patient is making diet and exercise modifications. Vital  signs are stable. He is up to date with immunizations. Reviewed labs. Discussed how to increase testosterone levels. He does not want to start testosterone replacement because he would like to consider starting a family.         Follow Up   Return in about 1 year (around 5/16/2026) for Annual physical- labs 1-2 weeks.  Patient was given instructions and counseling regarding his condition or for health maintenance advice. Please see specific information pulled into the AVS if appropriate.       Electronically signed by MONA Hernandez, 05/18/25, 7:18 PM EDT.